# Patient Record
Sex: FEMALE | Race: OTHER | URBAN - METROPOLITAN AREA
[De-identification: names, ages, dates, MRNs, and addresses within clinical notes are randomized per-mention and may not be internally consistent; named-entity substitution may affect disease eponyms.]

---

## 2017-06-06 ENCOUNTER — OUTPATIENT (OUTPATIENT)
Dept: OUTPATIENT SERVICES | Facility: HOSPITAL | Age: 62
LOS: 1 days | Discharge: HOME | End: 2017-06-06

## 2017-06-28 DIAGNOSIS — R53.81 OTHER MALAISE: ICD-10-CM

## 2017-06-28 DIAGNOSIS — E78.4 OTHER HYPERLIPIDEMIA: ICD-10-CM

## 2017-06-28 DIAGNOSIS — R53.83 OTHER FATIGUE: ICD-10-CM

## 2017-06-28 DIAGNOSIS — Z00.00 ENCOUNTER FOR GENERAL ADULT MEDICAL EXAMINATION WITHOUT ABNORMAL FINDINGS: ICD-10-CM

## 2018-04-30 ENCOUNTER — OUTPATIENT (OUTPATIENT)
Dept: OUTPATIENT SERVICES | Facility: HOSPITAL | Age: 63
LOS: 1 days | Discharge: HOME | End: 2018-04-30

## 2018-05-01 DIAGNOSIS — E78.4 OTHER HYPERLIPIDEMIA: ICD-10-CM

## 2018-05-01 DIAGNOSIS — I10 ESSENTIAL (PRIMARY) HYPERTENSION: ICD-10-CM

## 2019-09-09 ENCOUNTER — OUTPATIENT (OUTPATIENT)
Dept: OUTPATIENT SERVICES | Facility: HOSPITAL | Age: 64
LOS: 1 days | Discharge: HOME | End: 2019-09-09

## 2019-09-09 DIAGNOSIS — E78.9 DISORDER OF LIPOPROTEIN METABOLISM, UNSPECIFIED: ICD-10-CM

## 2019-09-09 DIAGNOSIS — R53.83 OTHER FATIGUE: ICD-10-CM

## 2019-09-09 DIAGNOSIS — I10 ESSENTIAL (PRIMARY) HYPERTENSION: ICD-10-CM

## 2023-08-02 PROBLEM — Z00.00 ENCOUNTER FOR PREVENTIVE HEALTH EXAMINATION: Status: ACTIVE | Noted: 2023-08-02

## 2023-08-17 ENCOUNTER — APPOINTMENT (OUTPATIENT)
Dept: ORTHOPEDIC SURGERY | Facility: CLINIC | Age: 68
End: 2023-08-17
Payer: MEDICARE

## 2023-08-17 VITALS — WEIGHT: 135 LBS | HEIGHT: 63 IN | BODY MASS INDEX: 23.92 KG/M2

## 2023-08-17 PROCEDURE — 73110 X-RAY EXAM OF WRIST: CPT | Mod: RT

## 2023-08-17 PROCEDURE — L3908: CPT | Mod: KX,RT

## 2023-08-17 PROCEDURE — 73090 X-RAY EXAM OF FOREARM: CPT | Mod: RT

## 2023-08-17 PROCEDURE — 99202 OFFICE O/P NEW SF 15 MIN: CPT

## 2023-08-17 NOTE — DATA REVIEWED
[FreeTextEntry1] : Radiographs 3 views of the right wrist reviewed showing a comminuted right-sided distal radius and distal ulnar shaft fracture.  Radiographs 2 views of the right forearm show a comminuted distal radius and ulnar shaft fracture distally.

## 2023-08-17 NOTE — HISTORY OF PRESENT ILLNESS
[de-identified] : 68-year-old female with fall resulting in a right wrist injury.  She comes in the office today for evaluation.  Injury occurred in July and she was offered surgery did not have that done and she comes in for the evaluation today.

## 2023-08-17 NOTE — PHYSICAL EXAM
[de-identified] : Patient is removed from her cast patient has resolving swelling there is resolving blistering there is good rotation to the forearm good flexion extension she is nontender along the fracture site and the radius and the ulna she has good motion to the fingers

## 2023-08-17 NOTE — ASSESSMENT
[FreeTextEntry1] : This is my initial treatment for this comminuted right-sided intra-articular distal radius fracture along with a distal ulnar shaft fracture.  She did not opt for surgical intervention when this was offered at an outside facility.  She actually is healing well.  She will see us back in 3 to 4 weeks we took her out of the cast placed into a removable wrist brace and a started allow her more wrist motion and digital motion.  Repeat radiographs in 3 weeks of the right wrist

## 2023-08-22 ENCOUNTER — APPOINTMENT (OUTPATIENT)
Dept: ORTHOPEDIC SURGERY | Facility: CLINIC | Age: 68
End: 2023-08-22

## 2023-09-14 ENCOUNTER — APPOINTMENT (OUTPATIENT)
Dept: ORTHOPEDIC SURGERY | Facility: CLINIC | Age: 68
End: 2023-09-14
Payer: MEDICARE

## 2023-09-14 ENCOUNTER — RESULT CHARGE (OUTPATIENT)
Age: 68
End: 2023-09-14

## 2023-09-14 DIAGNOSIS — S52.571A OTHER INTRAARTICULAR FRACTURE OF LOWER END OF RIGHT RADIUS, INITIAL ENCOUNTER FOR CLOSED FRACTURE: ICD-10-CM

## 2023-09-14 PROCEDURE — 73090 X-RAY EXAM OF FOREARM: CPT | Mod: RT

## 2023-09-14 PROCEDURE — 99213 OFFICE O/P EST LOW 20 MIN: CPT

## 2023-09-14 PROCEDURE — 73110 X-RAY EXAM OF WRIST: CPT | Mod: RT

## 2023-09-19 ENCOUNTER — APPOINTMENT (OUTPATIENT)
Dept: ORTHOPEDIC SURGERY | Facility: CLINIC | Age: 68
End: 2023-09-19

## 2023-09-26 ENCOUNTER — APPOINTMENT (OUTPATIENT)
Dept: ORTHOPEDIC SURGERY | Facility: AMBULATORY SURGERY CENTER | Age: 68
End: 2023-09-26

## 2023-10-26 ENCOUNTER — APPOINTMENT (OUTPATIENT)
Dept: ORTHOPEDIC SURGERY | Facility: CLINIC | Age: 68
End: 2023-10-26